# Patient Record
Sex: MALE | Race: WHITE | URBAN - METROPOLITAN AREA
[De-identification: names, ages, dates, MRNs, and addresses within clinical notes are randomized per-mention and may not be internally consistent; named-entity substitution may affect disease eponyms.]

---

## 2022-05-06 ENCOUNTER — OFFICE VISIT (OUTPATIENT)
Dept: INTERNAL MEDICINE CLINIC | Age: 67
End: 2022-05-06
Payer: COMMERCIAL

## 2022-05-06 VITALS
OXYGEN SATURATION: 97 % | RESPIRATION RATE: 14 BRPM | TEMPERATURE: 98.1 F | DIASTOLIC BLOOD PRESSURE: 82 MMHG | HEIGHT: 77 IN | WEIGHT: 227 LBS | SYSTOLIC BLOOD PRESSURE: 117 MMHG | BODY MASS INDEX: 26.8 KG/M2 | HEART RATE: 75 BPM

## 2022-05-06 DIAGNOSIS — Z76.89 ENCOUNTER TO ESTABLISH CARE: Primary | ICD-10-CM

## 2022-05-06 DIAGNOSIS — R39.16 BENIGN PROSTATIC HYPERPLASIA (BPH) WITH STRAINING ON URINATION: ICD-10-CM

## 2022-05-06 DIAGNOSIS — J30.2 SEASONAL ALLERGIC RHINITIS, UNSPECIFIED TRIGGER: ICD-10-CM

## 2022-05-06 DIAGNOSIS — K27.9 PUD (PEPTIC ULCER DISEASE): ICD-10-CM

## 2022-05-06 DIAGNOSIS — K58.1 IRRITABLE BOWEL SYNDROME WITH CONSTIPATION: ICD-10-CM

## 2022-05-06 DIAGNOSIS — Z00.00 WELLNESS EXAMINATION: ICD-10-CM

## 2022-05-06 DIAGNOSIS — L29.9 ITCHING: ICD-10-CM

## 2022-05-06 DIAGNOSIS — N40.1 BENIGN PROSTATIC HYPERPLASIA (BPH) WITH STRAINING ON URINATION: ICD-10-CM

## 2022-05-06 PROCEDURE — 99204 OFFICE O/P NEW MOD 45 MIN: CPT | Performed by: INTERNAL MEDICINE

## 2022-05-06 RX ORDER — FLUTICASONE PROPIONATE 50 MCG
2 SPRAY, SUSPENSION (ML) NASAL DAILY
Qty: 1 EACH | Refills: 0 | Status: SHIPPED | OUTPATIENT
Start: 2022-05-06 | End: 2022-05-29

## 2022-05-06 RX ORDER — CETIRIZINE HCL 10 MG
10 TABLET ORAL DAILY
Qty: 30 TABLET | Refills: 2 | Status: SHIPPED | OUTPATIENT
Start: 2022-05-06 | End: 2022-08-04

## 2022-05-06 RX ORDER — TROSPIUM CHLORIDE 20 MG/1
20 TABLET, FILM COATED ORAL
COMMUNITY
End: 2022-10-25

## 2022-05-06 RX ORDER — DICYCLOMINE HYDROCHLORIDE 20 MG/1
20 TABLET ORAL EVERY 6 HOURS
COMMUNITY

## 2022-05-06 RX ORDER — CALC/MAG/B COMPLEX/D3/HERB 61
TABLET ORAL
COMMUNITY

## 2022-05-06 NOTE — PROGRESS NOTES
Sánchez Zaidi is a 74 y.o. male  Chief Complaint   Patient presents with    Establish Care       Visit Vitals  /82 (BP 1 Location: Left upper arm, BP Patient Position: Sitting, BP Cuff Size: Adult)   Pulse 75   Temp 98.1 °F (36.7 °C) (Temporal)   Resp 14   Ht 6' 5\" (1.956 m)   Wt 227 lb (103 kg)   SpO2 97%   BMI 26.92 kg/m²        HPI  Mr. Shakir Lee is a 73 yo male with history of IBS, BPH presents to clinic to establish care and burning ear pain of 6 weeks duration. Patient has been using urgent cares for his medical needs and was recently seen in urgent care for ear pain and was given antibiotics which he completed and the symptoms are improving. He has burning sensation of ear, body itching, runny nose, sneezing, burning eye pain. Denies fever, chills, chest pain, shortness of breath. BPH: he has urology and has underwent TURP and reports to have removed 40% of prostate. His last urology appointment was in June 2021 and had cystoscopy which showed open urethra. He continues to have nocturia and hesitancy. He is taking tropspium and he has associated dry mouth/lips     IBS/PUD: takes lansoprazole for history of PUD, he takes linzess for IBS. The linzess causes diarrhea and hence tries to manage IBS with diet. He had EGD and colonoscopy by Dr. Monica Horne and he was noted to have hiatal hernia but everything else is good. Colonoscopy to be done in 5 years. 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 He doesn't know if he has all the vaccinations, he received 2 doses of covid vaccine. He underwent colonoscopy and EGD in January 2021 and everything was normal, he was asked to follow up in 5 years.       Social History     Socioeconomic History    Marital status:    Tobacco Use    Smoking status: Never Smoker    Smokeless tobacco: Never Used   Substance and Sexual Activity    Alcohol use: Never    Drug use: Never    Sexual activity: Yes      . History reviewed. No pertinent past medical history. Not on File       ROS  Review of Systems   All other systems reviewed and are negative. EXAM  Physical Exam  Constitutional:       General: He is not in acute distress. Appearance: Normal appearance. He is not toxic-appearing. HENT:      Head: Normocephalic and atraumatic. Right Ear: Tympanic membrane and ear canal normal.      Left Ear: Tympanic membrane and ear canal normal.      Nose: No congestion or rhinorrhea. Eyes:      General:         Right eye: No discharge. Extraocular Movements: Extraocular movements intact. Pupils: Pupils are equal, round, and reactive to light. Cardiovascular:      Rate and Rhythm: Normal rate and regular rhythm. Heart sounds: Normal heart sounds. No murmur heard. No gallop. Pulmonary:      Effort: Pulmonary effort is normal. No respiratory distress. Breath sounds: Normal breath sounds. No wheezing or rales. Abdominal:      General: There is no distension. Palpations: Abdomen is soft. There is no mass. Tenderness: There is no abdominal tenderness. There is no right CVA tenderness, left CVA tenderness, guarding or rebound. Hernia: No hernia is present. Musculoskeletal:         General: No swelling or deformity. Right lower leg: No edema. Skin:     Coloration: Skin is not jaundiced. Findings: No bruising or lesion. Comments: excoriations on chest and back, no features of malignancy noted    Neurological:      General: No focal deficit present. Mental Status: He is alert and oriented to person, place, and time. Motor: No weakness. Psychiatric:         Mood and Affect: Mood normal.         Behavior: Behavior normal.         Thought Content:  Thought content normal.         Judgment: Judgment normal.         Health Maintenance Due Topic Date Due    COVID-19 Vaccine (1) Never done    DTaP/Tdap/Td series (1 - Tdap) Never done    Lipid Screen  Never done    Colorectal Cancer Screening Combo  Never done    Shingrix Vaccine Age 50> (1 of 2) Never done    Pneumococcal 65+ years (1 - PCV) Never done       ASSESSMENT/PLAN    1. Encounter to establish care  2. Wellness examination  -     T4 (THYROXINE); Future  -     TSH 3RD GENERATION; Future  -     HEMOGLOBIN A1C WITH EAG; Future  -     LIPID PANEL; Future  -     METABOLIC PANEL, COMPREHENSIVE; Future  -     CBC WITH AUTOMATED DIFF; Future      3. PUD (peptic ulcer disease)  -     CBC WITH AUTOMATED DIFF; Future        - continue lansoprazole      4. Irritable bowel syndrome with constipation        -cont linaclotide and diet modifications         -f/u with GI   5. Benign prostatic hyperplasia (BPH) with straining on urination        -will obtain records from urology office         -cont trospium     6. Seasonal allergic rhinitis, unspecified trigger  7. Itching  -     fluticasone propionate (FLONASE) 50 mcg/actuation nasal spray; 2 Sprays by Both Nostrils route daily for 90 days. -     cetirizine (ZYRTEC) 10 mg tablet; Take 1 Tablet by mouth daily for 90 days.       Marco A Bee MD

## 2022-05-06 NOTE — Clinical Note
Please obtain medical records from Dr. Serge Wallace office, [facility] [address]: colonoscopy and endoscopy reports and last provider note.    Please obtain medical records Dr. Brayden Bustos's office at [facility] urology at [facility] point: physician note

## 2022-05-06 NOTE — PROGRESS NOTES
Reviewed record in preparation for visit and have obtained necessary documentation. Identified pt with two pt identifiers(name and ). Chief Complaint   Patient presents with    Establish Care     Vitals:    22 1012   BP: 117/82   Pulse: 75   Resp: 14   Temp: 98.1 °F (36.7 °C)   TempSrc: Temporal   SpO2: 97%   Weight: 227 lb (103 kg)   Height: 6' 5\" (1.956 m)        Health Maintenance Due   Topic Date Due    Hepatitis C Test  Never done    Depresssion Screening  Never done    COVID-19 Vaccine (1) Never done    DTaP/Tdap/Td  (1 - Tdap) Never done    Cholesterol Test   Never done    Colorectal Screening  Never done    Shingles Vaccine (1 of 2) Never done    Pneumococcal Vaccine (1 - PCV) Never done       Mr. Isha Franklin has a reminder for a \"due or due soon\" health maintenance. I have asked that he discuss this further with his primary care provider for follow-up on this health maintenance. Coordination of Care Questionnaire:  :     1) Have you been to an emergency room, urgent care clinic since your last visit? no   Hospitalized since your last visit? no             2) Have you seen or consulted any other health care providers outside of [address] [place] [address] since your last visit? no  (Include any pap smears or colon screenings in this section.)    3. For patients aged 39-70: Has the patient had a colonoscopy / FIT/ Cologuard? Yes - Care Gap present. Rooming MA/LPN to request most recent results      If the patient is female:  4. For patients aged 41-77: Has the patient had a mammogram within the past 2 years? NA - based on age or sex       11. For patients aged 21-65: Has the patient had a pap smear? NA - based on age or sex      In the event something were to happen to you and you were unable to speak on your behalf, do you have an Advance Directive/ Living Will in place stating your wishes?  NO    Do you have an Advance Directive on file? no    6) Are you interested in receiving information on Advance Directives?no    Patient is accompanied by self I have received verbal consent from Fransisco Palomares to discuss any/all medical information while they are present in the room.

## 2022-05-28 DIAGNOSIS — J30.2 SEASONAL ALLERGIC RHINITIS, UNSPECIFIED TRIGGER: ICD-10-CM

## 2022-05-29 RX ORDER — FLUTICASONE PROPIONATE 50 MCG
SPRAY, SUSPENSION (ML) NASAL
Qty: 1 EACH | Refills: 2 | Status: SHIPPED | OUTPATIENT
Start: 2022-05-29 | End: 2022-10-24

## 2022-10-21 ENCOUNTER — NURSE TRIAGE (OUTPATIENT)
Dept: OTHER | Facility: CLINIC | Age: 67
End: 2022-10-21

## 2022-10-21 NOTE — TELEPHONE ENCOUNTER
Location of patient: [facility]    Received call from [facility] E Fitzgerald  at [facility] with Red Flag Complaint. Subjective: Caller states \"dry irritated throat and nose sometimes has small amt of blood in nose\"     Current Symptoms: dry nose and throat, has rib pain and ibs and has noted right swollen breast pain inside his rib cage area hx broken ribs in the past, does hurt with a deep breath and certain movements , no recent injury no significant med hx    Onset: 1 month ago;     Associated Symptoms: NA    Pain Severity: 3/10    Temperature: none     What has been tried: lozenges    LMP: NA Pregnant: NA    Recommended disposition: See in Office Today    Care advice provided, patient verbalizes understanding; denies any other questions or concerns; instructed to call back for any new or worsening symptoms. Patient/Caller agrees with recommended disposition; writer provided warm transfer to [place] at [facility] for appointment scheduling    Attention Provider: Thank you for allowing me to participate in the care of your patient. The patient was connected to triage in response to information provided to the [facility]. Please do not respond through this encounter as the response is not directed to a shared pool.       Reason for Disposition   Patient wants to be seen    Protocols used: Chest Pain-ADULT-[place]

## 2022-10-24 ENCOUNTER — OFFICE VISIT (OUTPATIENT)
Dept: INTERNAL MEDICINE CLINIC | Age: 67
End: 2022-10-24
Payer: COMMERCIAL

## 2022-10-24 VITALS
SYSTOLIC BLOOD PRESSURE: 108 MMHG | HEIGHT: 77 IN | HEART RATE: 78 BPM | RESPIRATION RATE: 14 BRPM | DIASTOLIC BLOOD PRESSURE: 77 MMHG | WEIGHT: 227 LBS | BODY MASS INDEX: 26.8 KG/M2 | TEMPERATURE: 98.6 F | OXYGEN SATURATION: 98 %

## 2022-10-24 DIAGNOSIS — J30.9 ALLERGIC RHINITIS, UNSPECIFIED SEASONALITY, UNSPECIFIED TRIGGER: ICD-10-CM

## 2022-10-24 DIAGNOSIS — B00.9 HSV INFECTION: Primary | ICD-10-CM

## 2022-10-24 PROCEDURE — 99213 OFFICE O/P EST LOW 20 MIN: CPT | Performed by: INTERNAL MEDICINE

## 2022-10-24 RX ORDER — AZELASTINE HYDROCHLORIDE, FLUTICASONE PROPIONATE 137; 50 UG/1; UG/1
1 SPRAY, METERED NASAL 2 TIMES DAILY
Qty: 1 EACH | Refills: 1 | Status: SHIPPED | OUTPATIENT
Start: 2022-10-24 | End: 2023-02-21

## 2022-10-24 RX ORDER — VALACYCLOVIR HYDROCHLORIDE 1 G/1
1000 TABLET, FILM COATED ORAL DAILY
Qty: 30 TABLET | Refills: 0 | Status: SHIPPED | OUTPATIENT
Start: 2022-10-24 | End: 2022-11-18

## 2022-10-24 NOTE — PROGRESS NOTES
Stanislaw Plaza is a 74 y.o. male  Chief Complaint   Patient presents with    Shingles     Had shingles a month ago - was prescribed meds and it isn't as bad as before      Sore Throat     Started around the same time as the shingles - his nose throat and skin is dry - losing sleep very irrated      Visit Vitals  /77 (BP 1 Location: Left upper arm, BP Patient Position: Sitting, BP Cuff Size: Adult)   Pulse 78   Temp 98.6 °F (37 °C) (Temporal)   Resp 14   Ht 6' 5\" (1.956 m)   Wt 227 lb (103 kg)   SpO2 98%   BMI 26.92 kg/m²          HPI  Mr. Jeffery Steen is a 74 y.o. male who presented to clinic due to sneezing, nasal congestion, watery and itchy eyes that improved with oTC antihistamines and flonase . Symptoms recurred again and his throat and nose itches and has post nasal drip with white phlegm and cough. 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 Social History     Socioeconomic History    Marital status:    Tobacco Use    Smoking status: Never    Smokeless tobacco: Never   Substance and Sexual Activity    Alcohol use: Never    Drug use: Never    Sexual activity: Yes      . History reviewed. No pertinent past medical history. No Known Allergies       ROS  Review of Systems   All other systems reviewed and are negative. EXAM  Physical Exam  Vitals reviewed. Constitutional:       Appearance: Normal appearance. Cardiovascular:      Rate and Rhythm: Normal rate and regular rhythm. Pulses: Normal pulses. Heart sounds: Normal heart sounds. No murmur heard. Pulmonary:      Effort: Pulmonary effort is normal. No respiratory distress. Breath sounds: Normal breath sounds. Neurological:      Mental Status: He is alert. Psychiatric:         Mood and Affect: Mood normal.         Thought Content:  Thought content normal.         Judgment: Judgment normal.     Health Maintenance Due   Topic Date Due    COVID-19 Vaccine (1) Never done DTaP/Tdap/Td series (1 - Tdap) Never done    Shingrix Vaccine Age 50> (1 of 2) Never done    Pneumococcal 65+ years (1 - PCV) Never done    Flu Vaccine (1) Never done       ASSESSMENT/PLAN    Diagnoses and all orders for this visit:    1. HSV infection  -     valACYclovir (VALTREX) 1 gram tablet; Take 1 Tablet by mouth daily for 30 days. 2. Allergic rhinitis, unspecified seasonality, unspecified trigger  -     azelastine-fluticasone (Dymista) 137-50 mcg/spray; 1 [place] by Both Nostrils route two (2) times a day for 120 days.           Michelle Christianson MD